# Patient Record
Sex: FEMALE | Race: BLACK OR AFRICAN AMERICAN | URBAN - METROPOLITAN AREA
[De-identification: names, ages, dates, MRNs, and addresses within clinical notes are randomized per-mention and may not be internally consistent; named-entity substitution may affect disease eponyms.]

---

## 2020-11-24 ENCOUNTER — OFFICE VISIT (OUTPATIENT)
Dept: URBAN - METROPOLITAN AREA CLINIC 17 | Facility: CLINIC | Age: 45
End: 2020-11-24

## 2022-10-14 ENCOUNTER — P2P PATIENT RECORD (OUTPATIENT)
Age: 47
End: 2022-10-14

## 2023-01-06 ENCOUNTER — OFFICE VISIT (OUTPATIENT)
Dept: URBAN - METROPOLITAN AREA CLINIC 105 | Facility: CLINIC | Age: 48
End: 2023-01-06
Payer: MEDICAID

## 2023-01-06 VITALS
DIASTOLIC BLOOD PRESSURE: 87 MMHG | HEIGHT: 64 IN | HEART RATE: 96 BPM | TEMPERATURE: 97.5 F | BODY MASS INDEX: 26.29 KG/M2 | WEIGHT: 154 LBS | SYSTOLIC BLOOD PRESSURE: 128 MMHG

## 2023-01-06 DIAGNOSIS — K59.09 CHRONIC CONSTIPATION: ICD-10-CM

## 2023-01-06 DIAGNOSIS — Z90.3 S/P GASTRIC SLEEVE PROCEDURE: ICD-10-CM

## 2023-01-06 DIAGNOSIS — Z12.11 SCREEN FOR COLON CANCER: ICD-10-CM

## 2023-01-06 DIAGNOSIS — K62.5 RECTAL BLEEDING: ICD-10-CM

## 2023-01-06 PROBLEM — 329281000119107: Status: ACTIVE | Noted: 2023-01-06

## 2023-01-06 PROCEDURE — 99204 OFFICE O/P NEW MOD 45 MIN: CPT | Performed by: INTERNAL MEDICINE

## 2023-01-06 RX ORDER — PLECANATIDE 3 MG/1
TAKE 1 TABLET (3 MG) BY ORAL ROUTE ONCE DAILY FOR 30 DAYS TABLET ORAL ONCE A DAY
Qty: 30 | Refills: 0 | OUTPATIENT
Start: 2023-01-06 | End: 2023-02-05

## 2023-01-06 NOTE — HPI-TODAY'S VISIT:
6/2018 42 yo lady pt of Dr Jacqui Garcia. She says Linzess 290 and Amitiza 24 mchg bid did not work. Trulance gave her diarrhea as she had just had her colon prep. She is willing to try again. She has zero spontaneous BMs a week.  1/6/23 /says she [facility]  Surgery was successful. 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  Was seen at [facility] in [place]. She had an EGD . She had [facility] review her pathology from her sleeve and was told it was benign and not cancer. 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 from [facility] She did not see an Oncologist and did not follow up. Here for constipation "I cant go to the bathroom"/ And then says sometimes out of nowhere she will have a giant BM with really bad cramps prior. Has rectal bleeding when she has a BM and is always bloated.  She has surgery scheduled next week to remove her ;post op abdominal keloid and for breast augumentation surgery.

## 2023-02-17 ENCOUNTER — OFFICE VISIT (OUTPATIENT)
Dept: URBAN - METROPOLITAN AREA CLINIC 105 | Facility: CLINIC | Age: 48
End: 2023-02-17

## 2023-02-17 VITALS — HEIGHT: 64 IN

## 2023-04-17 ENCOUNTER — DASHBOARD ENCOUNTERS (OUTPATIENT)
Age: 48
End: 2023-04-17

## 2023-04-17 ENCOUNTER — WEB ENCOUNTER (OUTPATIENT)
Dept: URBAN - METROPOLITAN AREA CLINIC 17 | Facility: CLINIC | Age: 48
End: 2023-04-17

## 2023-04-17 ENCOUNTER — OFFICE VISIT (OUTPATIENT)
Dept: URBAN - METROPOLITAN AREA CLINIC 17 | Facility: CLINIC | Age: 48
End: 2023-04-17
Payer: MEDICAID

## 2023-04-17 ENCOUNTER — LAB OUTSIDE AN ENCOUNTER (OUTPATIENT)
Dept: URBAN - METROPOLITAN AREA CLINIC 17 | Facility: CLINIC | Age: 48
End: 2023-04-17

## 2023-04-17 VITALS
HEART RATE: 83 BPM | WEIGHT: 149 LBS | TEMPERATURE: 97.3 F | SYSTOLIC BLOOD PRESSURE: 105 MMHG | DIASTOLIC BLOOD PRESSURE: 72 MMHG | BODY MASS INDEX: 25.44 KG/M2 | HEIGHT: 64 IN

## 2023-04-17 DIAGNOSIS — K59.09 CHRONIC CONSTIPATION: ICD-10-CM

## 2023-04-17 DIAGNOSIS — Z90.3 S/P GASTRIC SLEEVE PROCEDURE: ICD-10-CM

## 2023-04-17 DIAGNOSIS — Z12.11 SCREEN FOR COLON CANCER: ICD-10-CM

## 2023-04-17 DIAGNOSIS — K62.5 RECTAL BLEEDING: ICD-10-CM

## 2023-04-17 PROCEDURE — 99214 OFFICE O/P EST MOD 30 MIN: CPT | Performed by: INTERNAL MEDICINE

## 2023-04-17 NOTE — HPI-TODAY'S VISIT:
6/2018 44 yo lady pt of Dr Jacqui Garcia. She says Linzess 290 and Amitiza 24 mchg bid did not work. Trulance gave her diarrhea as she had just had her colon prep. She is willing to try again. She has zero spontaneous BMs a week.  1/6/23 /says she had bariatric surgery 6/2019 - gastric sleeve at [facility] with Usman Salgado.  Surgery was successful. 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  Was seen at [facility] in [place]. She had an EGD . She had [facility] review her pathology from her sleeve and was told it was benign and not cancer. 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 from [facility] She did not see an Oncologist and did not follow up. Here for constipation "I cant go to the bathroom"/ And then says sometimes out of nowhere she will have a giant BM with really bad cramps prior. Has rectal bleeding when she has a BM and is always bloated.  She has surgery scheduled next week to remove her ;post op abdominal keloid and for breast augumentation surgery.  4/17/23 here with .  Says Trulance did not work. Says can go months w/o a BM.  Did not complete ARM " I dont know why I didnt do it".  Her colonoscopy in 2018 was poorly prepped.

## 2023-04-28 ENCOUNTER — TELEPHONE ENCOUNTER (OUTPATIENT)
Dept: URBAN - METROPOLITAN AREA CLINIC 17 | Facility: CLINIC | Age: 48
End: 2023-04-28

## 2023-06-16 ENCOUNTER — P2P PATIENT RECORD (OUTPATIENT)
Age: 48
End: 2023-06-16